# Patient Record
Sex: MALE | Race: WHITE
[De-identification: names, ages, dates, MRNs, and addresses within clinical notes are randomized per-mention and may not be internally consistent; named-entity substitution may affect disease eponyms.]

---

## 2020-07-17 ENCOUNTER — HOSPITAL ENCOUNTER (INPATIENT)
Dept: HOSPITAL 47 - EC | Age: 60
LOS: 3 days | Discharge: HOME | DRG: 194 | End: 2020-07-20
Attending: INTERNAL MEDICINE | Admitting: INTERNAL MEDICINE
Payer: COMMERCIAL

## 2020-07-17 DIAGNOSIS — J18.9: Primary | ICD-10-CM

## 2020-07-17 DIAGNOSIS — Z88.5: ICD-10-CM

## 2020-07-17 DIAGNOSIS — F17.210: ICD-10-CM

## 2020-07-17 DIAGNOSIS — J91.8: ICD-10-CM

## 2020-07-17 DIAGNOSIS — Z20.828: ICD-10-CM

## 2020-07-17 DIAGNOSIS — Z71.6: ICD-10-CM

## 2020-07-17 DIAGNOSIS — F10.20: ICD-10-CM

## 2020-07-17 DIAGNOSIS — J44.1: ICD-10-CM

## 2020-07-17 DIAGNOSIS — J44.0: ICD-10-CM

## 2020-07-17 DIAGNOSIS — J98.11: ICD-10-CM

## 2020-07-17 LAB
ALBUMIN SERPL-MCNC: 3.6 G/DL (ref 3.5–5)
ALP SERPL-CCNC: 84 U/L (ref 38–126)
ALT SERPL-CCNC: 12 U/L (ref 4–49)
ANION GAP SERPL CALC-SCNC: 10 MMOL/L
APTT BLD: 25.5 SEC (ref 22–30)
AST SERPL-CCNC: 30 U/L (ref 17–59)
BASOPHILS # BLD AUTO: 0.1 K/UL (ref 0–0.2)
BASOPHILS NFR BLD AUTO: 0 %
BUN SERPL-SCNC: 12 MG/DL (ref 9–20)
CALCIUM SPEC-MCNC: 8.9 MG/DL (ref 8.4–10.2)
CHLORIDE SERPL-SCNC: 104 MMOL/L (ref 98–107)
CO2 SERPL-SCNC: 22 MMOL/L (ref 22–30)
D DIMER PPP FEU-MCNC: 1.65 MG/L FEU (ref ?–0.6)
EOSINOPHIL # BLD AUTO: 0.5 K/UL (ref 0–0.7)
EOSINOPHIL NFR BLD AUTO: 3 %
ERYTHROCYTE [DISTWIDTH] IN BLOOD BY AUTOMATED COUNT: 4.84 M/UL (ref 4.3–5.9)
ERYTHROCYTE [DISTWIDTH] IN BLOOD: 12.5 % (ref 11.5–15.5)
GLUCOSE BLD-MCNC: 140 MG/DL (ref 75–99)
GLUCOSE SERPL-MCNC: 98 MG/DL (ref 74–99)
HCT VFR BLD AUTO: 45.4 % (ref 39–53)
HGB BLD-MCNC: 14.7 GM/DL (ref 13–17.5)
INR PPP: 1 (ref ?–1.2)
LDH SPEC-CCNC: 526 U/L (ref 313–618)
LYMPHOCYTES # SPEC AUTO: 1.1 K/UL (ref 1–4.8)
LYMPHOCYTES NFR SPEC AUTO: 8 %
MAGNESIUM SPEC-SCNC: 2.1 MG/DL (ref 1.6–2.3)
MCH RBC QN AUTO: 30.3 PG (ref 25–35)
MCHC RBC AUTO-ENTMCNC: 32.3 G/DL (ref 31–37)
MCV RBC AUTO: 93.7 FL (ref 80–100)
MONOCYTES # BLD AUTO: 0.7 K/UL (ref 0–1)
MONOCYTES NFR BLD AUTO: 5 %
NEUTROPHILS # BLD AUTO: 11.5 K/UL (ref 1.3–7.7)
NEUTROPHILS NFR BLD AUTO: 81 %
PLATELET # BLD AUTO: 230 K/UL (ref 150–450)
POTASSIUM SERPL-SCNC: 4.2 MMOL/L (ref 3.5–5.1)
PROT SERPL-MCNC: 6.3 G/DL (ref 6.3–8.2)
PT BLD: 10.4 SEC (ref 9–12)
SODIUM SERPL-SCNC: 136 MMOL/L (ref 137–145)
WBC # BLD AUTO: 14.2 K/UL (ref 3.8–10.6)

## 2020-07-17 PROCEDURE — 87798 DETECT AGENT NOS DNA AMP: CPT

## 2020-07-17 PROCEDURE — 87634 RSV DNA/RNA AMP PROBE: CPT

## 2020-07-17 PROCEDURE — 85730 THROMBOPLASTIN TIME PARTIAL: CPT

## 2020-07-17 PROCEDURE — 87529 HSV DNA AMP PROBE: CPT

## 2020-07-17 PROCEDURE — 71275 CT ANGIOGRAPHY CHEST: CPT

## 2020-07-17 PROCEDURE — 83735 ASSAY OF MAGNESIUM: CPT

## 2020-07-17 PROCEDURE — 80053 COMPREHEN METABOLIC PANEL: CPT

## 2020-07-17 PROCEDURE — 87252 VIRUS INOCULATION TISSUE: CPT

## 2020-07-17 PROCEDURE — 87498 ENTEROVIRUS PROBE&REVRS TRNS: CPT

## 2020-07-17 PROCEDURE — 87502 INFLUENZA DNA AMP PROBE: CPT

## 2020-07-17 PROCEDURE — 94640 AIRWAY INHALATION TREATMENT: CPT

## 2020-07-17 PROCEDURE — 96375 TX/PRO/DX INJ NEW DRUG ADDON: CPT

## 2020-07-17 PROCEDURE — 89050 BODY FLUID CELL COUNT: CPT

## 2020-07-17 PROCEDURE — 83605 ASSAY OF LACTIC ACID: CPT

## 2020-07-17 PROCEDURE — 96365 THER/PROPH/DIAG IV INF INIT: CPT

## 2020-07-17 PROCEDURE — 82728 ASSAY OF FERRITIN: CPT

## 2020-07-17 PROCEDURE — 83615 LACTATE (LD) (LDH) ENZYME: CPT

## 2020-07-17 PROCEDURE — 80048 BASIC METABOLIC PNL TOTAL CA: CPT

## 2020-07-17 PROCEDURE — 87102 FUNGUS ISOLATION CULTURE: CPT

## 2020-07-17 PROCEDURE — 88305 TISSUE EXAM BY PATHOLOGIST: CPT

## 2020-07-17 PROCEDURE — 71046 X-RAY EXAM CHEST 2 VIEWS: CPT

## 2020-07-17 PROCEDURE — 85379 FIBRIN DEGRADATION QUANT: CPT

## 2020-07-17 PROCEDURE — 84157 ASSAY OF PROTEIN OTHER: CPT

## 2020-07-17 PROCEDURE — 96367 TX/PROPH/DG ADDL SEQ IV INF: CPT

## 2020-07-17 PROCEDURE — 87075 CULTR BACTERIA EXCEPT BLOOD: CPT

## 2020-07-17 PROCEDURE — 86140 C-REACTIVE PROTEIN: CPT

## 2020-07-17 PROCEDURE — 82945 GLUCOSE OTHER FLUID: CPT

## 2020-07-17 PROCEDURE — 84484 ASSAY OF TROPONIN QUANT: CPT

## 2020-07-17 PROCEDURE — 87496 CYTOMEG DNA AMP PROBE: CPT

## 2020-07-17 PROCEDURE — 87040 BLOOD CULTURE FOR BACTERIA: CPT

## 2020-07-17 PROCEDURE — 84145 PROCALCITONIN (PCT): CPT

## 2020-07-17 PROCEDURE — 71045 X-RAY EXAM CHEST 1 VIEW: CPT

## 2020-07-17 PROCEDURE — 93005 ELECTROCARDIOGRAM TRACING: CPT

## 2020-07-17 PROCEDURE — 94760 N-INVAS EAR/PLS OXIMETRY 1: CPT

## 2020-07-17 PROCEDURE — 87070 CULTURE OTHR SPECIMN AEROBIC: CPT

## 2020-07-17 PROCEDURE — 99285 EMERGENCY DEPT VISIT HI MDM: CPT

## 2020-07-17 PROCEDURE — 85025 COMPLETE CBC W/AUTO DIFF WBC: CPT

## 2020-07-17 PROCEDURE — 87205 SMEAR GRAM STAIN: CPT

## 2020-07-17 PROCEDURE — 88108 CYTOPATH CONCENTRATE TECH: CPT

## 2020-07-17 PROCEDURE — 85610 PROTHROMBIN TIME: CPT

## 2020-07-17 PROCEDURE — 36415 COLL VENOUS BLD VENIPUNCTURE: CPT

## 2020-07-17 RX ADMIN — IPRATROPIUM BROMIDE AND ALBUTEROL SULFATE SCH ML: .5; 3 SOLUTION RESPIRATORY (INHALATION) at 20:57

## 2020-07-17 RX ADMIN — CEFAZOLIN SCH MLS/HR: 330 INJECTION, POWDER, FOR SOLUTION INTRAMUSCULAR; INTRAVENOUS at 17:04

## 2020-07-17 NOTE — XR
EXAMINATION TYPE: XR chest 1V portable

 

DATE OF EXAM: 7/17/2020

 

COMPARISON:

 

HISTORY: Suspected Covid pneumonia, chest pain x3 days

 

TECHNIQUE: AP chest

 

FINDINGS: There is a small to moderate right pleural effusion. Mild infiltrate is adjacent.

 

IMPRESSION:

1.  Small to moderate right pleural effusion with adjacent infiltrate.

## 2020-07-17 NOTE — ED
General Adult HPI





- General


Chief complaint: Upper Respiratory Infection


Stated complaint: Cough


Time Seen by Provider: 07/17/20 14:21


Source: patient, EMS, RN notes reviewed


Mode of arrival: EMS


Limitations: no limitations





- History of Present Illness


Initial comments: 





Patient is a pleasant 60-year-old male presenting to the emergency Department 

with cough difficulty breathing chest discomfort.  No radiation.  Chest 

discomfort is sharp and present specifically with cough.  It is there a little 

bit otherwise.  Patient has been coughing and with symptoms for the past 3 days.

 Patient has had subjective fevers at home.  Cough has clear yellow sputum.  

Patient is a smoker.  No history of previous COPD or asthma diagnosis.





- Related Data


                                Home Medications











 Medication  Instructions  Recorded  Confirmed


 


Acetaminophen Tab [Tylenol Tab] 1,000 mg PO Q6HR PRN 07/17/20 07/17/20











                                    Allergies











Allergy/AdvReac Type Severity Reaction Status Date / Time


 


codeine AdvReac  Nausea Verified 07/17/20 15:01














Review of Systems


ROS Statement: 


Those systems with pertinent positive or pertinent negative responses have been 

documented in the HPI.





ROS Other: All systems not noted in ROS Statement are negative.


Constitutional: Reports: fever, chills


Eyes: Denies: eye pain


ENT: Denies: ear pain


Respiratory: Reports: cough, dyspnea


Cardiovascular: Reports: chest pain


Endocrine: Reports: fatigue


Gastrointestinal: Denies: abdominal pain


Genitourinary: Denies: dysuria


Musculoskeletal: Denies: back pain


Skin: Denies: rash


Neurological: Denies: weakness





Past Medical History


Past Medical History: No Reported History


History of Any Multi-Drug Resistant Organisms: None Reported


Past Surgical History: No Surgical Hx Reported


Past Psychological History: No Psychological Hx Reported


Smoking Status: Current every day smoker


Past Alcohol Use History: Daily


Past Drug Use History: Marijuana





General Exam


Limitations: no limitations


General appearance: alert, in no apparent distress


Head exam: Present: normocephalic


Eye exam: Present: normal appearance, PERRL


Neck exam: Present: normal inspection


Respiratory exam: Present: wheezes, chest wall tenderness


Cardiovascular Exam: Present: regular rate, normal rhythm


  ** Expanded


Peripheral pulses: 2+: Posterior Tibialis (R), Posterior Tibialis (L), Dorsalis 

Pedis (R), Dorsalis Pedis (L)


GI/Abdominal exam: Present: soft.  Absent: distended, tenderness


Extremities exam: Present: normal inspection.  Absent: pedal edema, calf 

tenderness


Back exam: Present: normal inspection


Neurological exam: Present: alert


Psychiatric exam: Present: normal affect, normal mood


Skin exam: Present: normal color





Course


                                   Vital Signs











  07/17/20 07/17/20 07/17/20





  14:12 15:12 15:39


 


Temperature 100.1 F H  


 


Pulse Rate 82 79 70


 


Respiratory 18 18 





Rate   


 


Blood Pressure  133/79 


 


O2 Sat by Pulse 98 96 





Oximetry   














  07/17/20





  15:44


 


Temperature 


 


Pulse Rate 71


 


Respiratory 





Rate 


 


Blood Pressure 


 


O2 Sat by Pulse 





Oximetry 














EKG Findings





- EKG Comments:


EKG Findings:: Normal sinus rhythm 74.  .  .  .  QTc 450.  

Normal axis.  Right bundle branch block.  No acute ST change.





Medical Decision Making





- Medical Decision Making





Patient reevaluated and updated.  Case discussed with Dr. Ramirez, who will admit

for hospital call.  He is aware of CTs scan pending.





- Lab Data


Result diagrams: 


                                 07/17/20 14:20





                                 07/17/20 14:20


                                   Lab Results











  07/17/20 07/17/20 07/17/20 Range/Units





  14:20 14:20 14:20 


 


WBC  14.2 H    (3.8-10.6)  k/uL


 


RBC  4.84    (4.30-5.90)  m/uL


 


Hgb  14.7    (13.0-17.5)  gm/dL


 


Hct  45.4    (39.0-53.0)  %


 


MCV  93.7    (80.0-100.0)  fL


 


MCH  30.3    (25.0-35.0)  pg


 


MCHC  32.3    (31.0-37.0)  g/dL


 


RDW  12.5    (11.5-15.5)  %


 


Plt Count  230    (150-450)  k/uL


 


Neutrophils %  81    %


 


Lymphocytes %  8    %


 


Monocytes %  5    %


 


Eosinophils %  3    %


 


Basophils %  0    %


 


Neutrophils #  11.5 H    (1.3-7.7)  k/uL


 


Lymphocytes #  1.1    (1.0-4.8)  k/uL


 


Monocytes #  0.7    (0-1.0)  k/uL


 


Eosinophils #  0.5    (0-0.7)  k/uL


 


Basophils #  0.1    (0-0.2)  k/uL


 


PT   10.4   (9.0-12.0)  sec


 


INR   1.0   (<1.2)  


 


APTT   25.5   (22.0-30.0)  sec


 


D-Dimer   1.65 H   (<0.60)  mg/L FEU


 


Sodium    136 L  (137-145)  mmol/L


 


Potassium    4.2  (3.5-5.1)  mmol/L


 


Chloride    104  ()  mmol/L


 


Carbon Dioxide    22  (22-30)  mmol/L


 


Anion Gap    10  mmol/L


 


BUN    12  (9-20)  mg/dL


 


Creatinine    0.60 L  (0.66-1.25)  mg/dL


 


Est GFR (CKD-EPI)AfAm    >90  (>60 ml/min/1.73 sqM)  


 


Est GFR (CKD-EPI)NonAf    >90  (>60 ml/min/1.73 sqM)  


 


Glucose    98  (74-99)  mg/dL


 


Plasma Lactic Acid Ashok     (0.7-2.0)  mmol/L


 


Calcium    8.9  (8.4-10.2)  mg/dL


 


Magnesium    2.1  (1.6-2.3)  mg/dL


 


Total Bilirubin    0.6  (0.2-1.3)  mg/dL


 


AST    30  (17-59)  U/L


 


ALT    12  (4-49)  U/L


 


Alkaline Phosphatase    84  ()  U/L


 


Lactate Dehydrogenase    526  (313-618)  U/L


 


Total Protein    6.3  (6.3-8.2)  g/dL


 


Albumin    3.6  (3.5-5.0)  g/dL














  07/17/20 Range/Units





  14:20 


 


WBC   (3.8-10.6)  k/uL


 


RBC   (4.30-5.90)  m/uL


 


Hgb   (13.0-17.5)  gm/dL


 


Hct   (39.0-53.0)  %


 


MCV   (80.0-100.0)  fL


 


MCH   (25.0-35.0)  pg


 


MCHC   (31.0-37.0)  g/dL


 


RDW   (11.5-15.5)  %


 


Plt Count   (150-450)  k/uL


 


Neutrophils %   %


 


Lymphocytes %   %


 


Monocytes %   %


 


Eosinophils %   %


 


Basophils %   %


 


Neutrophils #   (1.3-7.7)  k/uL


 


Lymphocytes #   (1.0-4.8)  k/uL


 


Monocytes #   (0-1.0)  k/uL


 


Eosinophils #   (0-0.7)  k/uL


 


Basophils #   (0-0.2)  k/uL


 


PT   (9.0-12.0)  sec


 


INR   (<1.2)  


 


APTT   (22.0-30.0)  sec


 


D-Dimer   (<0.60)  mg/L FEU


 


Sodium   (137-145)  mmol/L


 


Potassium   (3.5-5.1)  mmol/L


 


Chloride   ()  mmol/L


 


Carbon Dioxide   (22-30)  mmol/L


 


Anion Gap   mmol/L


 


BUN   (9-20)  mg/dL


 


Creatinine   (0.66-1.25)  mg/dL


 


Est GFR (CKD-EPI)AfAm   (>60 ml/min/1.73 sqM)  


 


Est GFR (CKD-EPI)NonAf   (>60 ml/min/1.73 sqM)  


 


Glucose   (74-99)  mg/dL


 


Plasma Lactic Acid Ashok  1.0  (0.7-2.0)  mmol/L


 


Calcium   (8.4-10.2)  mg/dL


 


Magnesium   (1.6-2.3)  mg/dL


 


Total Bilirubin   (0.2-1.3)  mg/dL


 


AST   (17-59)  U/L


 


ALT   (4-49)  U/L


 


Alkaline Phosphatase   ()  U/L


 


Lactate Dehydrogenase   (313-618)  U/L


 


Total Protein   (6.3-8.2)  g/dL


 


Albumin   (3.5-5.0)  g/dL














- Radiology Data


Radiology results: image reviewed (Chest x-ray shows small right moderate right 

effusion with adjacent infiltrate.)





Disposition


Clinical Impression: 


 Pneumonia





Disposition: ADMITTED AS IP TO THIS [facility]


Condition: Serious


Is patient prescribed a controlled substance at d/c from ED?: No


Referrals: 


None,Stated [Primary Care Provider] - 1-2 days


Decision Time: 16:09

## 2020-07-17 NOTE — CT
EXAMINATION TYPE: CT angio chest

 

DATE OF EXAM: 7/17/2020

 

COMPARISON: None

 

HISTORY: Shortness of breath.

 

CT DLP: 296.1 mGycm

Automated exposure control for dose reduction was used.

 

CONTRAST: 

Performed with IV Contrast, patient injected with 75 mL of Isovue 370.

 

There are some emphysematous bulla at the lung apices. There is moderate size right pleural effusion 
with right basilar infiltrate and atelectasis. Heart size is fairly normal. There is no pericardial e
ffusion. There are no hilar masses. There is no mediastinal adenopathy.

 

Thoracic aorta is intact. There is no aneurysm or dissection. There are calculi in the upper poles of
 both kidneys that are partly visualized.

 

There is normal contrast opacification of the pulmonary arteries. I see no filling defect.

 

Thoracic vertebra have normal alignment. There is anterior wedging of L1 vertebra 25% that is probabl
y old. There is slight depression of the superior endplate of T11 10 percent.

 

IMPRESSION:

Moderate right pleural effusion. Right basilar infiltrate and atelectasis. No evidence of pulmonary e
mbolism.

## 2020-07-18 LAB
ANION GAP SERPL CALC-SCNC: 10 MMOL/L
BASOPHILS # BLD AUTO: 0 K/UL (ref 0–0.2)
BASOPHILS NFR BLD AUTO: 0 %
BUN SERPL-SCNC: 12 MG/DL (ref 9–20)
CALCIUM SPEC-MCNC: 8.9 MG/DL (ref 8.4–10.2)
CELL CNT PNL FLD: 100
CHLORIDE SERPL-SCNC: 104 MMOL/L (ref 98–107)
CO2 SERPL-SCNC: 23 MMOL/L (ref 22–30)
DEPRECATED POLYS # FLD: 60 %
EOSINOPHIL # BLD AUTO: 0 K/UL (ref 0–0.7)
EOSINOPHIL NFR BLD AUTO: 0 %
ERYTHROCYTE [DISTWIDTH] IN BLOOD BY AUTOMATED COUNT: 4.51 M/UL (ref 4.3–5.9)
ERYTHROCYTE [DISTWIDTH] IN BLOOD: 12.6 % (ref 11.5–15.5)
FERRITIN SERPL-MCNC: 488.7 NG/ML (ref 22–322)
GLUCOSE FLD-MCNC: 165 MG/DL
GLUCOSE SERPL-MCNC: 242 MG/DL (ref 74–99)
HCT VFR BLD AUTO: 42.9 % (ref 39–53)
HGB BLD-MCNC: 14.2 GM/DL (ref 13–17.5)
LYMPHOCYTES # SPEC AUTO: 0.5 K/UL (ref 1–4.8)
LYMPHOCYTES NFR SPEC AUTO: 4 %
MCH RBC QN AUTO: 31.5 PG (ref 25–35)
MCHC RBC AUTO-ENTMCNC: 33.1 G/DL (ref 31–37)
MCV RBC AUTO: 95 FL (ref 80–100)
MONOCYTES # BLD AUTO: 0.3 K/UL (ref 0–1)
MONOCYTES NFR BLD AUTO: 2 %
MONONUC CELLS # FLD: 35 %
NEUTROPHILS # BLD AUTO: 13.2 K/UL (ref 1.3–7.7)
NEUTROPHILS NFR BLD AUTO: 94 %
NUC CELL # FLD: 3100 /UL
PLATELET # BLD AUTO: 241 K/UL (ref 150–450)
POTASSIUM SERPL-SCNC: 3.8 MMOL/L (ref 3.5–5.1)
PROT FLD-MCNC: 4400 MG/DL
SODIUM SERPL-SCNC: 137 MMOL/L (ref 137–145)
WBC # BLD AUTO: 14 K/UL (ref 3.8–10.6)

## 2020-07-18 PROCEDURE — 0W993ZX DRAINAGE OF RIGHT PLEURAL CAVITY, PERCUTANEOUS APPROACH, DIAGNOSTIC: ICD-10-PCS

## 2020-07-18 RX ADMIN — IPRATROPIUM BROMIDE AND ALBUTEROL SULFATE SCH ML: .5; 3 SOLUTION RESPIRATORY (INHALATION) at 08:40

## 2020-07-18 RX ADMIN — METHYLPREDNISOLONE SODIUM SUCCINATE SCH MG: 125 INJECTION, POWDER, FOR SOLUTION INTRAMUSCULAR; INTRAVENOUS at 15:37

## 2020-07-18 RX ADMIN — METHYLPREDNISOLONE SODIUM SUCCINATE SCH MG: 125 INJECTION, POWDER, FOR SOLUTION INTRAMUSCULAR; INTRAVENOUS at 20:47

## 2020-07-18 RX ADMIN — IPRATROPIUM BROMIDE AND ALBUTEROL SULFATE SCH ML: .5; 3 SOLUTION RESPIRATORY (INHALATION) at 20:29

## 2020-07-18 RX ADMIN — IPRATROPIUM BROMIDE AND ALBUTEROL SULFATE SCH ML: .5; 3 SOLUTION RESPIRATORY (INHALATION) at 16:20

## 2020-07-18 RX ADMIN — IPRATROPIUM BROMIDE AND ALBUTEROL SULFATE SCH ML: .5; 3 SOLUTION RESPIRATORY (INHALATION) at 12:19

## 2020-07-18 RX ADMIN — CEFAZOLIN SCH: 330 INJECTION, POWDER, FOR SOLUTION INTRAMUSCULAR; INTRAVENOUS at 17:16

## 2020-07-18 RX ADMIN — HEPARIN SODIUM SCH UNIT: 5000 INJECTION, SOLUTION INTRAVENOUS; SUBCUTANEOUS at 15:36

## 2020-07-18 RX ADMIN — HEPARIN SODIUM SCH UNIT: 5000 INJECTION, SOLUTION INTRAVENOUS; SUBCUTANEOUS at 07:52

## 2020-07-18 RX ADMIN — AZITHROMYCIN SCH MG: 500 TABLET, FILM COATED ORAL at 12:38

## 2020-07-18 RX ADMIN — METHYLPREDNISOLONE SODIUM SUCCINATE SCH MG: 125 INJECTION, POWDER, FOR SOLUTION INTRAMUSCULAR; INTRAVENOUS at 07:51

## 2020-07-18 NOTE — XR
EXAMINATION TYPE: XR chest 1V portable

 

DATE OF EXAM: 7/18/2020

 

HISTORY: Right pleural effusion.

 

REFERENCE: Previous study dated 7/17/2020.

 

FINDINGS: There is a worsening right pleural effusion. There is right basilar airspace disease. The l
eft lung is overinflated. Heart size is obscured.

 

IMPRESSION: 

 

WORSENING OPACITY OF THE RIGHT LOWER LUNG REPRESENTING A COMBINATION OF PLEURAL FLUID AND AIRSPACE DI
SEASE.

## 2020-07-18 NOTE — P.PCN
Date of Procedure: 07/18/20


Preoperative Diagnosis: 


Right-sided pleural effusion


Postoperative Diagnosis: 


Right-sided pleural effusion


Procedure(s) Performed: 


Thoracentesis


Anesthesia: local


Surgeon: Pee Mariano


Pathology: other


Condition: stable


Disposition: floor


Operative Findings: 


A time out was performed and the chest x-ray was reviewed, the appropriate side 

was confirmed and marked. My hands were washed immediately prior to the 

procedure. I wore a surgical cap, mask with protective eyewear, sterile gown and

sterile gloves throughout the procedure. The patient was prepped and draped in a

sterile manner using chlorhexidine scrub after the appropriate level was 

percussed and confirmed by ultrasound. 1% lidocaine was used to anesthesize the 

skin, subcutaneous tissue, superior aspect of the rib periosteum and parietal 

pleura. A finder needle was then introduced over the superior aspect of the rib 

to locate the pleural fluid; 2colored fluid was aspirated at a depth of 

approximately 2 cm. A 10-blade scalpel was used to nick the skin at the 

insertion site. The Safe-t-Centesis needle was then introduced through the skin 

incision into the pleural space using negative aspiration pressure and the red 

colometric indicator to confirm appropriate positioning of the needle. The 

thoracentesis catheter was then threaded without difficulty. 1400 ml of turbid 

colored fluid was removed without difficulty. The catheter was then removed. No 

immediate complications were noted during the procedure. A post-procedure chest 

x-ray is pending at the time of this note. The fluid will  be sent for studies. 

Estimated blood loss is 0cc

## 2020-07-18 NOTE — P.HPIM
History of Present Illness


H&P Date: 07/17/20


Chief Complaint: CELESTE








PatPatient is a 60-year-old man without significant past medical history, daily 

smoking 1 pack/day, alcohol use 1 pint per day came to ER with the complaints of

cough and difficulty breathing along with right lower rib chest discomfort 

worsening with cough.  Denied any radiation of the pain.  Chest discomfort is 

sharp.  No complaints of fever or chills.  Patient does have cough with 

yellowish sputum.


Patient has been having symptoms for the past 3 days.  Patient did have some 

subjective fevers at home.  Was taking Tylenol for pain.


No nausea vomiting or abdominal pain or diarrhea.


No headache or dizziness or lightheadedness.  Denied any aspiration.





Laboratory data showed WBC 14.2, hemoglobin 14.7 and platelets 230


D-dimer is 1.65


Sodium 136, potassium 4.2, BUN 12 and creatinine 0.6


Ferritin level is 488.7 and lymphocytes 1.1, .6, 


CT angiogram of the chest


Coronavirus PCR is pending.





CT angiogram of the chest showed moderate right pleural effusion.  Right base 

left infiltrate and atelectasis.  No evidence of pulmonary embolism.





EKG showed normal sinus rhythm


Chest x-ray showed small to moderate right pleural effusion with adjacent 

infiltrate.








Review of Systems





Constitutional: Patient denies any fever or chills .  No generalized weakness or

weight loss.  


Abdomen: Patient denied nausea vomiting and diarrhea and abdominal pain.


Cardiovascular: Patient denies any chest pain or short of breath no 

palpitations.


Respiratory: Patient does have cough without sputum production.  Does have 

shortness of breath. Pleuritic right lower rib pain.


Neurologic: Patient denied any numbness or tingling headache.


Musculoskeletal: Patient denies any complaints of joint swelling or deformity.


Skin: Negative


Psychiatric: Negative


Endocrine: No heat or cold intolerance.  No recent weight gain.


Genitourinary: No dysuria or hematuria.


All other 14 point ROS negative except the above





Past Medical History


Past Medical History: No Reported History


History of Any Multi-Drug Resistant Organisms: None Reported


Past Surgical History: No Surgical Hx Reported


Past Psychological History: No Psychological Hx Reported


Smoking Status: Current every day smoker


Past Alcohol Use History: Daily


Past Drug Use History: Marijuana





- Past Family History


  ** Father


Family Medical History: No Reported History





Medications and Allergies


                                Home Medications











 Medication  Instructions  Recorded  Confirmed  Type


 


Acetaminophen Tab [Tylenol Tab] 1,000 mg PO Q6HR PRN 07/17/20 07/17/20 History








                                    Allergies











Allergy/AdvReac Type Severity Reaction Status Date / Time


 


codeine AdvReac  Nausea Verified 07/17/20 15:01














Physical Exam


Vitals: 


                                   Vital Signs











  Temp Pulse Pulse Resp BP BP Pulse Ox


 


 07/17/20 21:12   71     


 


 07/17/20 20:58   71     


 


 07/17/20 20:00  98 F   69  20   116/74  95


 


 07/17/20 18:46     18   


 


 07/17/20 17:58  98.1 F   76  18   113/74  95


 


 07/17/20 17:33     16   


 


 07/17/20 17:04  98.9 F  68   16  114/77   96


 


 07/17/20 15:44   71     


 


 07/17/20 15:39   70     


 


 07/17/20 15:12   79   18  133/79   96


 


 07/17/20 14:12  100.1 F H  82   18    98








                                Intake and Output











 07/17/20 07/17/20 07/17/20





 06:59 14:59 22:59


 


Other:   


 


  Weight  22.68 kg 68.039 kg

















PHYSICAL EXAMINATION: 


Patient is lying in the bed comfortably, no acute distress, awake alert and 

oriented.. 


HEENT: Normocephalic. Neck is supple. Pupils reactive. Nostrils clear. Oral 

cavity is moist. Ears reveal no drainage. 


Neck reveals no JVD, carotid bruits, or thyromegaly. 


CHEST EXAMINATION: Trachea is central. Symmetrical expansion. Bibasilar 

diminished air entry and coarse breath sounds.  Scattered rhonchi.  Minimal 

wheezing.. 


CARDIAC: Normal S1, S2 with no gallops. No murmurs 


ABDOMEN: Soft. no tenderness. Bowel sounds present. No organomegaly. No 

abdominal bruits. 


Extremities: reveal no edema.  No clubbing or cyanosis


Neurologically awake, alert, oriented x3 with well-coordinated movements.  No 

focal deficits noted


Skin: No rash or skin lesions. 


Psychiatric: Coperative.  Nonsuicidal


Musculoskeletal: No joint swelling or deformity.  Normal range of motion.








Results


CBC & Chem 7: 


                                 07/17/20 14:20





                                 07/17/20 14:20


Labs: 


                  Abnormal Lab Results - Last 24 Hours (Table)











  07/17/20 07/17/20 07/17/20 Range/Units





  14:20 14:20 14:20 


 


WBC  14.2 H    (3.8-10.6)  k/uL


 


Neutrophils #  11.5 H    (1.3-7.7)  k/uL


 


D-Dimer   1.65 H   (<0.60)  mg/L FEU


 


Sodium    136 L  (137-145)  mmol/L


 


Creatinine    0.60 L  (0.66-1.25)  mg/dL


 


C-Reactive Protein    216.6 H  (<10.0)  mg/L














Thrombosis Risk Factor Assmnt





- DVT/VTE Prophylaxis


DVT/VTE Prophylaxis: Pharmacologic Prophylaxis ordered





- Choose All That Apply


Any of the Below Risk Factors Present?: Yes


Each Factor Represents 1 point: Age 41-60 years


Other Risk Factors: No


Other congenital or acquired thrombophilia - If yes, enter type in comment: No


Thrombosis Risk Factor Assessment Total Risk Factor Score: 1


Thrombosis Risk Factor Assessment Level: Low Risk





Assessment and Plan


Assessment: 





Shortness of breath and pleuritic chest pain likely due to pleural effusion and 

infiltrate.


Moderate right-sided pleural effusion


Right lower lobe pneumonia


Acute COPD with exacerbation


Daily alcohol use one-point


Ongoing nicotine addiction with 1 pack/day cigarette smoking


DVT prophylaxis with heparin subcu





Plan:


Patient will be continued on oxygen therapy and duo nebs and IV steroids.  Will 

be continued on ceftriaxone and azithromycin.  Pulmonary was consulted due to 

moderate pleural effusion and underlying malignancy cannot be excluded.  

Continue to follow closely.  


Follow-up  coronavirus PCR report.


Smoking cessation has been counseled extensively.


Time with Patient: Greater than 30

## 2020-07-18 NOTE — P.CNPUL
History of Present Illness


Consult date: 07/18/20


Reason for consult: dyspnea, chest pain


History of present illness: 








60-year-old male patient, a chronic cigarette smoker no cold drinker, came into 

the hospital because of worsening shortness of breath and cough and difficulty 

breathing for the past 3 days along with pain across the right rib cage.  He was

having cold sweats.  No documented fever.  No aspiration.  No previous bouts of 

pneumonia.  White cell count was 14.2.  He will was 14.7.  D-dimer was 1.6.  

Electrolytes were within normal limits.  Chest x-ray showed a large right-sided 

pleural effusion the CAT scan of the chest showed moderate to large right-sided 

pleural effusion along with right lower lobe consolidation/atelectasis.  No this

of any pulmonary embolism.  EKG was in normal sinus rhythm.  The patient was 

started on accommodation Rocephin and Zithromax.  No travel history.  Corona 

virus covid  19 evaluation still pending for now.  He is currently afebrile.  He

is hemodynamically stable.  A bedside thoracentesis was done and a total of 1.4 

L of pleural fluid was aspirated from the right lung.





Review of Systems


Constitutional: Reports fatigue, Reports sweats


Eyes: denies as per HPI, denies blurred vision, denies bulging eye, denies 

decreased vision, denies diplopia, denies discharge, denies dry eye, denies 

irritation, denies itching, denies pain, denies photophobia, denies loss of 

peripheral vision, denies loss of vision, denies tunnel vision/blind spots


Ears: deny: decreased hearing, ear discharge, earache, tinnitus


Ears, nose, mouth and throat: Reports as per HPI


Breasts: absent: as per HPI, gynecomastia


Cardiovascular: Reports chest pain, Reports dyspnea on exertion


Respiratory: Reports cough


Gastrointestinal: Reports as per HPI


Genitourinary: Reports as per HPI


Musculoskeletal: Reports as per HPI


Musculoskeletal: absent: ankle pain, ankle stiffness, ankle swelling


Integumentary: Reports as per HPI


Neurological: Reports as per HPI


Psychiatric: Reports as per HPI


Endocrine: Reports as per HPI





Past Medical History


Past Medical History: No Reported History


Additional Past Medical History / Comment(s): alcoholism, 1 pint/day


History of Any Multi-Drug Resistant Organisms: None Reported


Past Surgical History: No Surgical Hx Reported


Past Psychological History: No Psychological Hx Reported


Smoking Status: Current every day smoker


Past Alcohol Use History: Daily


Past Drug Use History: Marijuana





- Past Family History


  ** Father


Family Medical History: No Reported History





Medications and Allergies


                                Home Medications











 Medication  Instructions  Recorded  Confirmed  Type


 


Acetaminophen Tab [Tylenol Tab] 1,000 mg PO Q6HR PRN 07/17/20 07/17/20 History








                                    Allergies











Allergy/AdvReac Type Severity Reaction Status Date / Time


 


codeine AdvReac  Nausea Verified 07/17/20 15:01














Physical Exam


Vitals: 


                                   Vital Signs











  Temp Pulse Pulse Resp BP BP BP


 


 07/18/20 08:51   76     


 


 07/18/20 08:41   72     


 


 07/18/20 07:59     20   


 


 07/18/20 07:35     20   


 


 07/18/20 07:00  98.1 F   77  18    121/75


 


 07/18/20 04:00     18   


 


 07/18/20 01:00  97.9 F   78  18   109/60 


 


 07/17/20 21:12   71     


 


 07/17/20 20:58   71     


 


 07/17/20 20:00  98 F   69  20   116/74 


 


 07/17/20 19:00     18   


 


 07/17/20 18:46     18   


 


 07/17/20 17:58  98.1 F   76  18   113/74 


 


 07/17/20 17:33     16   


 


 07/17/20 17:04  98.9 F  68   16  114/77  


 


 07/17/20 15:44   71     


 


 07/17/20 15:39   70     


 


 07/17/20 15:12   79   18  133/79  


 


 07/17/20 14:12  100.1 F H  82   18   














  Pulse Ox


 


 07/18/20 08:51 


 


 07/18/20 08:41 


 


 07/18/20 07:59  95


 


 07/18/20 07:35 


 


 07/18/20 07:00  91 L


 


 07/18/20 04:00 


 


 07/18/20 01:00  92 L


 


 07/17/20 21:12 


 


 07/17/20 20:58 


 


 07/17/20 20:00  95


 


 07/17/20 19:00 


 


 07/17/20 18:46 


 


 07/17/20 17:58  95


 


 07/17/20 17:33 


 


 07/17/20 17:04  96


 


 07/17/20 15:44 


 


 07/17/20 15:39 


 


 07/17/20 15:12  96


 


 07/17/20 14:12  98








                                Intake and Output











 07/17/20 07/18/20 07/18/20





 22:59 06:59 14:59


 


Intake Total 200 100 


 


Balance 200 100 


 


Intake:   


 


  Oral 200 100 


 


Other:   


 


  Voiding Method Toilet Toilet 


 


  # Voids 1 1 


 


  Weight 68.039 kg  














The patient appeared well nourished and normally developed. Vital signs as 

documented. Head exam is unremarkable. No scleral icterus or corneal arcus 

noted. Neck is without jugular venous distension, thyromegaly, or carotid b

ruits. Carotid upstrokes are brisk bilaterally. Lungs are diminished breath 

sound in the right lung base along with dullness to percussion.  The patient has

also scattered rhonchi and scattered external wheezes.  Cardiac exam reveals the

PMI to be normally sized and situated. Rhythm is regular. First and second heart

sounds normal. No murmurs, rubs or gallops. Abdominal exam reveals normal bowel 

sounds, no masses, no organomegaly and no aortic enlargement. Extremities are 

nonedematous and both femoral and pedal pulses are normal.Examination of the 

skin revealed no evidence of significant rashes, suspicious appearing nevi or 

other concerning lesions.Neurologically, the patient is awake and alert and the 

patient does not have any focal neurological deficit.  Cranial nerves are 

essentially intact.





Results





- Laboratory Findings


CBC and BMP: 


                                 07/18/20 09:10





                                 07/18/20 09:10


PT/INR, D-dimer











PT  10.4 sec (9.0-12.0)   07/17/20  14:20    


 


INR  1.0  (<1.2)   07/17/20  14:20    


 


D-Dimer  1.65 mg/L FEU (<0.60)  H  07/17/20  14:20    








Abnormal lab findings: 


                                  Abnormal Labs











  07/17/20 07/17/20 07/17/20





  14:20 14:20 14:20


 


WBC  14.2 H  


 


Neutrophils #  11.5 H  


 


Lymphocytes #   


 


D-Dimer   1.65 H 


 


Sodium    136 L


 


Creatinine    0.60 L


 


Glucose   


 


POC Glucose (mg/dL)   


 


Ferritin    488.7 H


 


C-Reactive Protein    216.6 H


 


Procalcitonin   














  07/17/20 07/17/20 07/18/20





  14:20 22:39 09:10


 


WBC    14.0 H


 


Neutrophils #    13.2 H


 


Lymphocytes #    0.5 L


 


D-Dimer   


 


Sodium   


 


Creatinine   


 


Glucose   


 


POC Glucose (mg/dL)   140 H 


 


Ferritin   


 


C-Reactive Protein   


 


Procalcitonin  0.10 H  














  07/18/20





  09:10


 


WBC 


 


Neutrophils # 


 


Lymphocytes # 


 


D-Dimer 


 


Sodium 


 


Creatinine  0.53 L


 


Glucose  242 H


 


POC Glucose (mg/dL) 


 


Ferritin 


 


C-Reactive Protein 


 


Procalcitonin 














- Diagnostic Findings


Chest x-ray: image reviewed


CT scan - chest: image reviewed





Assessment and Plan


Plan: 











1 right lower lobe pneumonia with parapneumonic effusion





2 moderate-sized right-sided pleural effusion along with compressive atelectasis

of the right lung base





3 shortness of breath secondary to above





4 COPD, with secondary exacerbation





5 smoker





6 alcoholism





Plan





Bedside thoracentesis was done.  A total of 1.4 L of pleural fluid was aspirated

from the right lung.  The fluid will be sent for analysis including cultures and

cytology.  Chemistry will be also sent on the fluid to differentiate between 

exudative transudate.  Continue same antibiotic coverage.  Continue 

bronchodilators.  Continue steroids.  Repeat chest x-ray following thoracentesis

showed no evidence of any pneumothorax.  We'll continue to follow.

## 2020-07-18 NOTE — XR
EXAMINATION TYPE: XR chest 1V

 

DATE OF EXAM: 7/18/2020

 

HISTORY: thora.

 

REFERENCE: Previous study of earlier today.

 

FINDINGS: There is a reduction in the patient's right-sided effusion following thoracentesis. There i
s a tiny right apical pneumothorax. Left lung remains clear. Heart size upper limits of normal.

 

IMPRESSION: 

 

SMALL, 5-10% BY VOLUME RIGHT APICAL PNEUMOTHORAX FOLLOWING THORACENTESIS.

## 2020-07-19 VITALS — RESPIRATION RATE: 16 BRPM

## 2020-07-19 RX ADMIN — METHYLPREDNISOLONE SODIUM SUCCINATE SCH MG: 125 INJECTION, POWDER, FOR SOLUTION INTRAMUSCULAR; INTRAVENOUS at 19:19

## 2020-07-19 RX ADMIN — METHYLPREDNISOLONE SODIUM SUCCINATE SCH MG: 125 INJECTION, POWDER, FOR SOLUTION INTRAMUSCULAR; INTRAVENOUS at 07:05

## 2020-07-19 RX ADMIN — METHYLPREDNISOLONE SODIUM SUCCINATE SCH MG: 125 INJECTION, POWDER, FOR SOLUTION INTRAMUSCULAR; INTRAVENOUS at 15:53

## 2020-07-19 RX ADMIN — IPRATROPIUM BROMIDE AND ALBUTEROL SULFATE SCH ML: .5; 3 SOLUTION RESPIRATORY (INHALATION) at 18:46

## 2020-07-19 RX ADMIN — HEPARIN SODIUM SCH UNIT: 5000 INJECTION, SOLUTION INTRAVENOUS; SUBCUTANEOUS at 07:06

## 2020-07-19 RX ADMIN — HEPARIN SODIUM SCH UNIT: 5000 INJECTION, SOLUTION INTRAVENOUS; SUBCUTANEOUS at 00:57

## 2020-07-19 RX ADMIN — HEPARIN SODIUM SCH UNIT: 5000 INJECTION, SOLUTION INTRAVENOUS; SUBCUTANEOUS at 15:53

## 2020-07-19 RX ADMIN — IPRATROPIUM BROMIDE AND ALBUTEROL SULFATE SCH ML: .5; 3 SOLUTION RESPIRATORY (INHALATION) at 08:52

## 2020-07-19 RX ADMIN — AZITHROMYCIN SCH MG: 500 TABLET, FILM COATED ORAL at 12:09

## 2020-07-19 RX ADMIN — IPRATROPIUM BROMIDE AND ALBUTEROL SULFATE SCH ML: .5; 3 SOLUTION RESPIRATORY (INHALATION) at 15:20

## 2020-07-19 RX ADMIN — CEFAZOLIN SCH: 330 INJECTION, POWDER, FOR SOLUTION INTRAMUSCULAR; INTRAVENOUS at 15:44

## 2020-07-19 RX ADMIN — METHYLPREDNISOLONE SODIUM SUCCINATE SCH MG: 125 INJECTION, POWDER, FOR SOLUTION INTRAMUSCULAR; INTRAVENOUS at 00:57

## 2020-07-19 RX ADMIN — IPRATROPIUM BROMIDE AND ALBUTEROL SULFATE SCH ML: .5; 3 SOLUTION RESPIRATORY (INHALATION) at 12:34

## 2020-07-19 NOTE — P.PN
Subjective


Progress Note Date: 07/19/20


Principal diagnosis: 





Dyspnea secondary to large right-sided pleural effusion





60-year-old male patient, a chronic cigarette smoker no cold drinker, came into 

the hospital because of worsening shortness of breath and cough and difficulty 

breathing for the past 3 days along with pain across the right rib cage.  He was

having cold sweats.  No documented fever.  No aspiration.  No previous bouts of 

pneumonia.  White cell count was 14.2.  He will was 14.7.  D-dimer was 1.6.  

Electrolytes were within normal limits.  Chest x-ray showed a large right-sided 

pleural effusion the CAT scan of the chest showed moderate to large right-sided 

pleural effusion along with right lower lobe consolidation/atelectasis.  No this

of any pulmonary embolism.  EKG was in normal sinus rhythm.  The patient was 

started on accommodation Rocephin and Zithromax.  No travel history.  Corona 

virus covid  19 evaluation still pending for now.  He is currently afebrile.  He

is hemodynamically stable.  A bedside thoracentesis was done and a total of 1.4 

L of pleural fluid was aspirated from the right lung.





The patient is seen today 07/19/2020 in follow-up on the regular medical floor. 

He is awake and alert in no acute distress.  Resting comfortably in bed.  

Breathing easier today compared to yesterday.  He is status post right-sided 

thoracentesis with 1.4 L of dark turbid fluid removed.  Exudative in nature with

LDH 1075, protein 4.4. Follow-up chest x-ray revealed improved aeration of the 

right lung.  He is maintaining good O2 saturations in the mid 90s on 2 L/m per 

nasal cannula.  Afebrile.  Hemodynamically stable.  Blood cultures reveal no 

growth.





Objective





- Vital Signs


Vital signs: 


                                   Vital Signs











Temp  98.1 F   07/19/20 07:00


 


Pulse  76   07/19/20 09:08


 


Resp  20   07/19/20 07:00


 


BP  98/58   07/19/20 07:00


 


Pulse Ox  96   07/19/20 07:00








                                 Intake & Output











 07/18/20 07/19/20 07/19/20





 18:59 06:59 18:59


 


Intake Total  60 200


 


Balance  60 200


 


Intake:   


 


  Intake, IV Titration  60 





  Amount   


 


    Sodium Chloride 0.9% 1,  60 





    000 ml @ 20 mls/hr IV .   





    Q24H [facility] Rx#:[id number]   


 


  Oral   200


 


Other:   


 


  Voiding Method Toilet Toilet 


 


  # Voids 3 1 


 


  # Bowel Movements  1 














- Exam





The patient appeared well nourished pleasant 60-year-old gentleman, normally 

developed. Vital signs as documented. Head exam is unremarkable. No scleral 

icterus or corneal arcus noted. Neck is without jugular venous distension, 

thyromegaly, or carotid bruits. Carotid upstrokes are brisk bilaterally. Lungs 

are diminished breath sound in the right lung base along with dullness to 

percussion.  The patient has also scattered rhonchi and scattered external 

wheezes.  Cardiac exam reveals the PMI to be normally sized and situated. Rhythm

is regular. First and second heart sounds normal. No murmurs, rubs or gallops. 

Abdominal exam reveals normal bowel sounds, no masses, no organomegaly and no 

aortic enlargement. Extremities are nonedematous and both femoral and pedal 

pulses are normal.Examination of the skin revealed no evidence of significant 

rashes, suspicious appearing nevi or other concerning lesions.Neurologically, 

the patient is awake and alert and the patient does not have any focal 

neurological deficit.  Cranial nerves are essentially intact.





- Labs


CBC & Chem 7: 


                                 07/18/20 09:10





                                 07/18/20 09:10


Labs: 


                      Microbiology - Last 24 Hours (Table)











 07/17/20 15:26 Blood Culture - Preliminary





 Blood    No Growth after 24 hours














Assessment and Plan


Assessment: 





1 right lower lobe pneumonia with parapneumonic effusion





2 moderate-sized right-sided pleural effusion along with compressive atelectasis

of the right lung base





3 shortness of breath secondary to above





4 COPD, with secondary exacerbation





5 smoker





6 alcoholism





Plan





The patient was seen and evaluated by Dr. Mariano


Chest x-ray and labs reviewed


Pleural fluid is exudative in nature


Awaiting final cultures and pathology


Repeat chest x-ray in a.m.


We'll continue to follow





I, the cosigning physician, performed a history & physical examination of the 

patient. Lungs sounds with crackles in the right base.  Maintaining good O2 

saturations in the 90s on 2 L/m per nasal cannula.  I discussed the assessment 

and plan of care with my nurse practitioner, Jeanette Baldwin. I attest to the above 

note as dictated by her.

## 2020-07-20 VITALS — DIASTOLIC BLOOD PRESSURE: 67 MMHG | SYSTOLIC BLOOD PRESSURE: 104 MMHG | TEMPERATURE: 97.4 F

## 2020-07-20 VITALS — HEART RATE: 72 BPM

## 2020-07-20 LAB
ANION GAP SERPL CALC-SCNC: 4 MMOL/L
BASOPHILS # BLD AUTO: 0 K/UL (ref 0–0.2)
BASOPHILS NFR BLD AUTO: 0 %
BUN SERPL-SCNC: 18 MG/DL (ref 9–20)
CALCIUM SPEC-MCNC: 8.7 MG/DL (ref 8.4–10.2)
CHLORIDE SERPL-SCNC: 105 MMOL/L (ref 98–107)
CO2 SERPL-SCNC: 29 MMOL/L (ref 22–30)
EOSINOPHIL # BLD AUTO: 0 K/UL (ref 0–0.7)
EOSINOPHIL NFR BLD AUTO: 0 %
ERYTHROCYTE [DISTWIDTH] IN BLOOD BY AUTOMATED COUNT: 3.91 M/UL (ref 4.3–5.9)
ERYTHROCYTE [DISTWIDTH] IN BLOOD: 12.6 % (ref 11.5–15.5)
GLUCOSE SERPL-MCNC: 111 MG/DL (ref 74–99)
HCT VFR BLD AUTO: 36.6 % (ref 39–53)
HGB BLD-MCNC: 12.1 GM/DL (ref 13–17.5)
LYMPHOCYTES # SPEC AUTO: 0.6 K/UL (ref 1–4.8)
LYMPHOCYTES NFR SPEC AUTO: 4 %
MCH RBC QN AUTO: 31.1 PG (ref 25–35)
MCHC RBC AUTO-ENTMCNC: 33.2 G/DL (ref 31–37)
MCV RBC AUTO: 93.6 FL (ref 80–100)
MONOCYTES # BLD AUTO: 0.4 K/UL (ref 0–1)
MONOCYTES NFR BLD AUTO: 3 %
NEUTROPHILS # BLD AUTO: 14.1 K/UL (ref 1.3–7.7)
NEUTROPHILS NFR BLD AUTO: 93 %
PLATELET # BLD AUTO: 258 K/UL (ref 150–450)
POTASSIUM SERPL-SCNC: 4.9 MMOL/L (ref 3.5–5.1)
SODIUM SERPL-SCNC: 138 MMOL/L (ref 137–145)
WBC # BLD AUTO: 15.2 K/UL (ref 3.8–10.6)

## 2020-07-20 RX ADMIN — METHYLPREDNISOLONE SODIUM SUCCINATE SCH MG: 125 INJECTION, POWDER, FOR SOLUTION INTRAMUSCULAR; INTRAVENOUS at 01:05

## 2020-07-20 RX ADMIN — IPRATROPIUM BROMIDE AND ALBUTEROL SULFATE SCH ML: .5; 3 SOLUTION RESPIRATORY (INHALATION) at 11:20

## 2020-07-20 RX ADMIN — HEPARIN SODIUM SCH UNIT: 5000 INJECTION, SOLUTION INTRAVENOUS; SUBCUTANEOUS at 01:05

## 2020-07-20 RX ADMIN — IPRATROPIUM BROMIDE AND ALBUTEROL SULFATE SCH ML: .5; 3 SOLUTION RESPIRATORY (INHALATION) at 14:55

## 2020-07-20 RX ADMIN — AZITHROMYCIN SCH MG: 500 TABLET, FILM COATED ORAL at 11:08

## 2020-07-20 RX ADMIN — HEPARIN SODIUM SCH UNIT: 5000 INJECTION, SOLUTION INTRAVENOUS; SUBCUTANEOUS at 08:02

## 2020-07-20 RX ADMIN — METHYLPREDNISOLONE SODIUM SUCCINATE SCH MG: 125 INJECTION, POWDER, FOR SOLUTION INTRAMUSCULAR; INTRAVENOUS at 08:01

## 2020-07-20 RX ADMIN — IPRATROPIUM BROMIDE AND ALBUTEROL SULFATE SCH ML: .5; 3 SOLUTION RESPIRATORY (INHALATION) at 07:42

## 2020-07-20 NOTE — P.PN
Subjective


Progress Note Date: 07/19/20


Principal diagnosis: 





Right lower lobe pneumonia and pleural effusion possible parapneumonic








PatPatient is a 60-year-old man without significant past medical history, daily 

smoking 1 pack/day, alcohol use 1 pint per day came to ER with the complaints of

cough and difficulty breathing along with right lower rib chest discomfort 

worsening with cough.  Denied any radiation of the pain.  Chest discomfort is 

sharp.  No complaints of fever or chills.  Patient does have cough with 

yellowish sputum.


Patient has been having symptoms for the past 3 days.  Patient did have some 

subjective fevers at home.  Was taking Tylenol for pain.


No nausea vomiting or abdominal pain or diarrhea.


No headache or dizziness or lightheadedness.  Denied any aspiration.





Laboratory data showed WBC 14.2, hemoglobin 14.7 and platelets 230


D-dimer is 1.65


Sodium 136, potassium 4.2, BUN 12 and creatinine 0.6


Ferritin level is 488.7 and lymphocytes 1.1, .6, 


CT angiogram of the chest


Coronavirus PCR is pending.





CT angiogram of the chest showed moderate right pleural effusion.  Right base 

left infiltrate and atelectasis.  No evidence of pulmonary embolism.





EKG showed normal sinus rhythm


Chest x-ray showed small to moderate right pleural effusion with adjacent 

infiltrate.





7/18/2020


Patient is currently lying in bed comfortably.  Denied any complaints of chest 

pain or worsening shortness of breath.  His pleuritic chest pain is better.  No 

complaints of fever or chills.  No nausea vomiting or abdominal pain.


Patient is status post right thoracentesis with 1.4 L fluid removal.  Follow-up 

fluid analysis.  Pulmonary is on board.





07/19/2020


Patient is currently sitting in the bed comfortably.  Able to tolerate oral 

diet.  Saturating well on oxygen at 2 L via nasal cannula.  Denied any 

complaints of chest pain and breathing status is much improved.  Patient is 

status post right-sided thoracentesis, fluid is mostly exudative.


Repeat chest x-ray showed improved aeration and no pneumothorax.  Patient has 

been afebrile.  Continued on antibiotics in the form of ceftriaxone and 

azithromycin.  Cultures have been negative.  Fluid cytology is pending.


Pulmonary is following.











Objective





- Vital Signs


Vital signs: 


                                   Vital Signs











Temp  97.9 F   07/19/20 13:50


 


Pulse  78   07/19/20 18:58


 


Resp  16   07/19/20 18:58


 


BP  101/54   07/19/20 13:50


 


Pulse Ox  98   07/19/20 15:21








                                 Intake & Output











 07/19/20 07/19/20 07/20/20





 06:59 18:59 06:59


 


Intake Total 60 860 


 


Balance 60 860 


 


Intake:   


 


  Intake, IV Titration 60 260 





  Amount   


 


    Sodium Chloride 0.9% 1, 60 160 





    000 ml @ 20 mls/hr IV .   





    Q24H [facility] Rx#:[id number]   


 


    cefTRIAXone 1 gm In  100 





    Sodium Chloride 0.9% 50   





    ml @ 100 mls/hr IVPB   





    Q24HR [facility] Rx#:[id number]   


 


  Oral  600 


 


Other:   


 


  Voiding Method Toilet  Toilet


 


  # Voids 1 1 3


 


  # Bowel Movements 1  














- Exam








PHYSICAL EXAMINATION: 


Patient is lying in the bed comfortably, no acute distress, awake alert and 

oriented.. 


HEENT: Normocephalic. Neck is supple. Pupils reactive. Nostrils clear. Oral 

cavity is moist. Ears reveal no drainage. 


Neck reveals no JVD, carotid bruits, or thyromegaly. 


CHEST EXAMINATION: Trachea is central. Symmetrical expansion. Bibasilar 

diminished air entry and coarse breath sounds.  Scattered rhonchi.  Minimal 

wheezing.. 


CARDIAC: Normal S1, S2 with no gallops. No murmurs 


ABDOMEN: Soft. no tenderness. Bowel sounds present. No organomegaly. No 

abdominal bruits. 


Extremities: reveal no edema.  No clubbing or cyanosis


Neurologically awake, alert, oriented x3 with well-coordinated movements.  No 

focal deficits noted


Skin: No rash or skin lesions. 


Psychiatric: Coperative.  Nonsuicidal


Musculoskeletal: No joint swelling or deformity.  Normal range of motion.





- Labs


CBC & Chem 7: 


                                 07/20/20 06:36





                                 07/20/20 06:36


Labs: 


                      Microbiology - Last 24 Hours (Table)











 07/17/20 15:26 Blood Culture - Preliminary





 Blood    No Growth after 48 hours














Assessment and Plan


Assessment: 





Shortness of breath and pleuritic chest pain likely due to pleural effusion and 

infiltrate.


Moderate right-sided pleural effusion.  Possible parapneumonic.  Status post 

thoracentesis.


Right lower lobe pneumonia


Acute COPD with exacerbation


Daily alcohol use one-point


Ongoing nicotine addiction with 1 pack/day cigarette smoking


DVT prophylaxis with heparin subcu





Plan:


Patient will be continued on oxygen therapy and duo nebs and IV steroids.  Will 

be continued on ceftriaxone and azithromycin.  Pulmonary was consulted due to 

moderate pleural effusion and underlying malignancy cannot be excluded.  

Continue to follow closely.  


Follow-up  coronavirus PCR report-negative.


Smoking cessation has been counseled extensively.


Time with Patient: Greater than 30

## 2020-07-20 NOTE — XR
EXAMINATION TYPE: XR chest 2V

 

DATE OF EXAM: 7/20/2020

 

COMPARISON: Prior chest x-ray 7/18/2020

 

HISTORY: Pneumonia

 

TECHNIQUE:  Frontal and lateral views of the chest are obtained.

 

FINDINGS:  Pneumothorax has resolved. There is blunting of the right costophrenic angle. Biapical ple
ural thickening is present. Bandlike areas of decreased attenuation at the right lung base likely ref
lect atelectasis. Heart size is stable.

 

IMPRESSION:  Resolution of pneumothorax. There is residual basilar effusion and associated atelectasi
s versus pneumonia on the right.

## 2020-07-20 NOTE — P.PN
Subjective


Progress Note Date: 07/20/20


Principal diagnosis: 


Dyspnea second to large right-sided pleural effusion





60-year-old male patient, a chronic cigarette smoker no cold drinker, came into 

the hospital because of worsening shortness of breath and cough and difficulty 

breathing for the past 3 days along with pain across the right rib cage.  He was

having cold sweats.  No documented fever.  No aspiration.  No previous bouts of 

pneumonia.  White cell count was 14.2.  He will was 14.7.  D-dimer was 1.6.  

Electrolytes were within normal limits.  Chest x-ray showed a large right-sided 

pleural effusion the CAT scan of the chest showed moderate to large right-sided 

pleural effusion along with right lower lobe consolidation/atelectasis.  No this

of any pulmonary embolism.  EKG was in normal sinus rhythm.  The patient was 

started on accommodation Rocephin and Zithromax.  No travel history.  Corona 

virus covid  19 evaluation still pending for now.  He is currently afebrile.  He

is hemodynamically stable.  A bedside thoracentesis was done and a total of 1.4 

L of pleural fluid was aspirated from the right lung.





The patient is seen today 07/19/2020 in follow-up on the regular medical floor. 

He is awake and alert in no acute distress.  Resting comfortably in bed.  

Breathing easier today compared to yesterday.  He is status post right-sided 

thoracentesis with 1.4 L of dark turbid fluid removed.  Exudative in nature with

LDH 1075, protein 4.4. Follow-up chest x-ray revealed improved aeration of the 

right lung.  He is maintaining good O2 saturations in the mid 90s on 2 L/m per 

nasal cannula.  Afebrile.  Hemodynamically stable.  Blood cultures reveal no 

growth.





On 07/20/2020 patient seen in follow-up on general medical floor.  Patient is 

status post right-sided thoracentesis on 07/18/2020, with removal of 1.4 L of 

pleural fluid.  Pleural fluid cytology is pending, cultures will be added, 

pleural fluid analysis revealed exudative fluid. COVID 19 was negative.  Patient

has been afebrile, for the past 48 hours.  Today's labs have been reviewed 

showing white blood cell count of 15.2, hemoglobin of 12.1, electrolytes are 

within normal limits, BUN is 18 creatinine 0.51.  Empiric antibiotics remain in 

place in the form of ceftriaxone and azithromycin.  Patient's breathing is 

comfortable, lung sounds are clear, diminished at the bases, chest x-ray shows 

resolution of the pneumothorax, residual basilar effusion and associated 

atelectasis on the right











Objective





- Vital Signs


Vital signs: 


                                   Vital Signs











Temp  97.4 F L  07/20/20 07:00


 


Pulse  76   07/20/20 11:30


 


Resp  16   07/20/20 07:00


 


BP  104/67   07/20/20 07:00


 


Pulse Ox  95   07/20/20 07:00








                                 Intake & Output











 07/19/20 07/20/20 07/20/20





 18:59 06:59 18:59


 


Intake Total 860 160 


 


Balance 860 160 


 


Intake:   


 


  Intake, IV Titration 260 160 





  Amount   


 


    Sodium Chloride 0.9% 1, 160 160 





    000 ml @ 20 mls/hr IV .   





    Q24H MUKUND Rx#:[id number]   


 


    cefTRIAXone 1 gm In 100  





    Sodium Chloride 0.9% 50   





    ml @ 100 mls/hr IVPB   





    Q24HR MUKUND Rx#:[id number]   


 


  Oral 600  


 


Other:   


 


  Voiding Method  Toilet Toilet


 


  # Voids 1 2 1














- Exam


 GENERAL EXAM: Alert, very pleasant, 52-year-old white male, on 2 L of oxygen.  

Pulse ox of 95% comfortable in no apparent distress.


HEAD: Normocephalic/atraumatic.


EYES: Normal reaction of pupils, equal size.  Conjunctiva pink, sclera white.


NOSE: Clear with pink turbinates.


THROAT: No erythema or exudates.


NECK: No masses, no JVD, no thyroid enlargement, no adenopathy.


CHEST: No chest wall deformity.  Symmetrical expansion. 


LUNGS: Equal air entry with diminished breath sounds at the right base, no 

rhonchi, no wheezing


CVS: Regular rate and rhythm, normal S1 and S2, no gallops, no murmurs, no rubs


ABDOMEN: Soft, nontender.  No hepatosplenomegaly, normal bowel sounds, no 

guarding or rigidity.


EXTREMITIES: No clubbing, no edema, no cyanosis, 2+ pulses and upper and lower 

extremities.


MUSCULOSKELETAL: Muscle strength and tone normal.


SPINE: No scoliosis or deformity


SKIN: No rashes


CENTRAL NERVOUS SYSTEM: Alert and oriented -3.  No focal deficits, tone is 

normal in all 4 extremities.


PSYCHIATRIC: Alert and oriented -3.  Appropriate affect.  Intact judgment and 

insight.











- Labs


CBC & Chem 7: 


                                 07/20/20 06:36





                                 07/20/20 06:36


Labs: 


                  Abnormal Lab Results - Last 24 Hours (Table)











  07/20/20 07/20/20 Range/Units





  06:36 06:36 


 


WBC  15.2 H   (3.8-10.6)  k/uL


 


RBC  3.91 L   (4.30-5.90)  m/uL


 


Hgb  12.1 L   (13.0-17.5)  gm/dL


 


Hct  36.6 L   (39.0-53.0)  %


 


Neutrophils #  14.1 H   (1.3-7.7)  k/uL


 


Lymphocytes #  0.6 L   (1.0-4.8)  k/uL


 


Creatinine   0.51 L  (0.66-1.25)  mg/dL


 


Glucose   111 H  (74-99)  mg/dL








                      Microbiology - Last 24 Hours (Table)











 07/17/20 15:26 Blood Culture - Preliminary





 Blood    No Growth after 48 hours














Assessment and Plan


Plan: 


 Assessment:





1 right lower lobe pneumonia with parapneumonic effusion





2 moderate-sized right-sided pleural effusion along with compressive atelectasis

of the right lung base, status post right-sided thoracentesis on 07/18/2020 with

removal of 1.4 L of pleural effusion, which was found to be exudative in nature,

cultures and cytology pending





3 shortness of breath secondary to above





4 COPD, with secondary exacerbation





5 smoker





6 alcoholism





Plan:





Chest x-ray has been reviewed, resolution of pneumothorax, and residual basilar 

effusion and associated atelectasis on the right, clinically patient has been 

stable.  We are awaiting results of the pleural fluid cytology and cultures, 

continue current antibiotic coverage, clinically stable, no fever or chills, 

from pulmonary perspective agent can be considered for discharge home today on 

oral antibiotics and he can finish outpatient course of oral Zithromax and oral 

Ceftin, he will need outpatient follow-up with Dr. Maraino in the office for 

follow-up chest x-ray and results of the cytology and pleural fluid cultures. 





I performed a history & physical examination of the patient and discussed their 

management with my nurse practitioner, Anayeli Guzman.  I reviewed the nurse 

practitioner's note and agree with the documented findings and plan of care.  

Lung sounds are positive for diminished breath sounds at the right base.  The 

findings and the impression was discussed with the patient.  I attest to the 

documentation by the nurse practitioner. 





Time with Patient: Less than 30

## 2020-07-20 NOTE — P.PN
Subjective


Progress Note Date: 07/18/20


Principal diagnosis: 





Right lower lobe pneumonia and pleural effusion possible parapneumonic








PatPatient is a 60-year-old man without significant past medical history, daily 

smoking 1 pack/day, alcohol use 1 pint per day came to ER with the complaints of

cough and difficulty breathing along with right lower rib chest discomfort 

worsening with cough.  Denied any radiation of the pain.  Chest discomfort is 

sharp.  No complaints of fever or chills.  Patient does have cough with 

yellowish sputum.


Patient has been having symptoms for the past 3 days.  Patient did have some 

subjective fevers at home.  Was taking Tylenol for pain.


No nausea vomiting or abdominal pain or diarrhea.


No headache or dizziness or lightheadedness.  Denied any aspiration.





Laboratory data showed WBC 14.2, hemoglobin 14.7 and platelets 230


D-dimer is 1.65


Sodium 136, potassium 4.2, BUN 12 and creatinine 0.6


Ferritin level is 488.7 and lymphocytes 1.1, .6, 


CT angiogram of the chest


Coronavirus PCR is pending.





CT angiogram of the chest showed moderate right pleural effusion.  Right base 

left infiltrate and atelectasis.  No evidence of pulmonary embolism.





EKG showed normal sinus rhythm


Chest x-ray showed small to moderate right pleural effusion with adjacent 

infiltrate.





7/18/2020


Patient is currently lying in bed comfortably.  Denied any complaints of chest 

pain or worsening shortness of breath.  His pleuritic chest pain is better.  No 

complaints of fever or chills.  No nausea vomiting or abdominal pain.


Patient is status post right thoracentesis with 1.4 L fluid removal.  Follow-up 

fluid analysis.  Pulmonary is on board.





Current medications reviewed.





Objective





- Vital Signs


Vital signs: 


                                   Vital Signs











Temp  97.5 F L  07/18/20 19:45


 


Pulse  78   07/18/20 20:39


 


Resp  18   07/18/20 19:45


 


BP  118/70   07/18/20 19:45


 


Pulse Ox  96   07/18/20 19:45








                                 Intake & Output











 07/18/20 07/18/20 07/19/20





 06:59 18:59 06:59


 


Intake Total 300  


 


Balance 300  


 


Intake:   


 


  Oral 300  


 


Other:   


 


  Voiding Method Toilet Toilet 


 


  # Voids 1 3 














- Exam








PHYSICAL EXAMINATION: 


Patient is lying in the bed comfortably, no acute distress, awake alert and 

oriented.. 


HEENT: Normocephalic. Neck is supple. Pupils reactive. Nostrils clear. Oral 

cavity is moist. Ears reveal no drainage. 


Neck reveals no JVD, carotid bruits, or thyromegaly. 


CHEST EXAMINATION: Trachea is central. Symmetrical expansion. Bibasilar 

diminished air entry and coarse breath sounds.  Scattered rhonchi.  Minimal 

wheezing.. 


CARDIAC: Normal S1, S2 with no gallops. No murmurs 


ABDOMEN: Soft. no tenderness. Bowel sounds present. No organomegaly. No 

abdominal bruits. 


Extremities: reveal no edema.  No clubbing or cyanosis


Neurologically awake, alert, oriented x3 with well-coordinated movements.  No 

focal deficits noted


Skin: No rash or skin lesions. 


Psychiatric: Coperative.  Nonsuicidal


Musculoskeletal: No joint swelling or deformity.  Normal range of motion.





- Labs


CBC & Chem 7: 


                                 07/18/20 09:10





                                 07/18/20 09:10


Labs: 


                  Abnormal Lab Results - Last 24 Hours (Table)











  07/17/20 07/17/20 07/17/20 Range/Units





  14:20 14:20 22:39 


 


WBC     (3.8-10.6)  k/uL


 


Neutrophils #     (1.3-7.7)  k/uL


 


Lymphocytes #     (1.0-4.8)  k/uL


 


Creatinine     (0.66-1.25)  mg/dL


 


Glucose     (74-99)  mg/dL


 


POC Glucose (mg/dL)    140 H  (75-99)  mg/dL


 


Ferritin  488.7 H    (22.0-322.0)  ng/mL


 


Procalcitonin   0.10 H   (0.02-0.09)  ng/mL














  07/18/20 07/18/20 Range/Units





  09:10 09:10 


 


WBC  14.0 H   (3.8-10.6)  k/uL


 


Neutrophils #  13.2 H   (1.3-7.7)  k/uL


 


Lymphocytes #  0.5 L   (1.0-4.8)  k/uL


 


Creatinine   0.53 L  (0.66-1.25)  mg/dL


 


Glucose   242 H  (74-99)  mg/dL


 


POC Glucose (mg/dL)    (75-99)  mg/dL


 


Ferritin    (22.0-322.0)  ng/mL


 


Procalcitonin    (0.02-0.09)  ng/mL








                      Microbiology - Last 24 Hours (Table)











 07/17/20 15:26 Blood Culture - Preliminary





 Blood    No Growth after 24 hours














Assessment and Plan


Assessment: 





Shortness of breath and pleuritic chest pain likely due to pleural effusion and 

infiltrate.


Moderate right-sided pleural effusion.  Possible parapneumonic.  Status post 

thoracentesis.


Right lower lobe pneumonia


Acute COPD with exacerbation


Daily alcohol use one-point


Ongoing nicotine addiction with 1 pack/day cigarette smoking


DVT prophylaxis with heparin subcu





Plan:


Patient will be continued on oxygen therapy and duo nebs and IV steroids.  Will 

be continued on ceftriaxone and azithromycin.  Pulmonary was consulted due to 

moderate pleural effusion and underlying malignancy cannot be excluded.  

Continue to follow closely.  


Follow-up  coronavirus PCR report-negative.


Smoking cessation has been counseled extensively.


Time with Patient: Greater than 30

## 2020-08-20 ENCOUNTER — HOSPITAL ENCOUNTER (OUTPATIENT)
Dept: HOSPITAL 47 - CPPFTMAIN | Age: 60
Discharge: HOME | End: 2020-08-20
Attending: INTERNAL MEDICINE
Payer: COMMERCIAL

## 2020-08-20 DIAGNOSIS — R94.2: ICD-10-CM

## 2020-08-20 DIAGNOSIS — J44.9: Primary | ICD-10-CM

## 2020-08-20 PROCEDURE — 94060 EVALUATION OF WHEEZING: CPT

## 2020-08-20 PROCEDURE — 94726 PLETHYSMOGRAPHY LUNG VOLUMES: CPT

## 2020-08-20 PROCEDURE — 94729 DIFFUSING CAPACITY: CPT
